# Patient Record
Sex: MALE | Race: WHITE | Employment: UNEMPLOYED | URBAN - NONMETROPOLITAN AREA
[De-identification: names, ages, dates, MRNs, and addresses within clinical notes are randomized per-mention and may not be internally consistent; named-entity substitution may affect disease eponyms.]

---

## 2019-07-05 LAB
ALBUMIN SERPL-MCNC: 4.3 G/DL
ALP BLD-CCNC: 73 U/L
ALT SERPL-CCNC: 32 U/L
ANION GAP SERPL CALCULATED.3IONS-SCNC: 14 MMOL/L
AST SERPL-CCNC: 22 U/L
BASOPHILS ABSOLUTE: 0 /ΜL
BASOPHILS RELATIVE PERCENT: 0.3 %
BILIRUB SERPL-MCNC: 1.4 MG/DL (ref 0.1–1.4)
BUN BLDV-MCNC: 11 MG/DL
CALCIUM SERPL-MCNC: 9.1 MG/DL
CHLORIDE BLD-SCNC: 106 MMOL/L
CO2: 23 MMOL/L
CREAT SERPL-MCNC: 1 MG/DL
EOSINOPHILS ABSOLUTE: 0.1 /ΜL
EOSINOPHILS RELATIVE PERCENT: 0.6 %
GFR CALCULATED: >60
GLUCOSE BLD-MCNC: 103 MG/DL
HCT VFR BLD CALC: 45.4 % (ref 41–53)
HEMOGLOBIN: 15.7 G/DL (ref 13.5–17.5)
LIPASE: 32 UNITS/L
LYMPHOCYTES ABSOLUTE: 1.8 /ΜL
LYMPHOCYTES RELATIVE PERCENT: 18.7 %
MCH RBC QN AUTO: 29.5 PG
MCHC RBC AUTO-ENTMCNC: 34.5 G/DL
MCV RBC AUTO: 85.3 FL
MONOCYTES ABSOLUTE: 0.7 /ΜL
MONOCYTES RELATIVE PERCENT: 7.3 %
NEUTROPHILS ABSOLUTE: 7.2 /ΜL
NEUTROPHILS RELATIVE PERCENT: 73.1 %
PDW BLD-RTO: 12.2 %
PLATELET # BLD: 254 K/ΜL
PMV BLD AUTO: 8.5 FL
POTASSIUM SERPL-SCNC: 3.5 MMOL/L
RBC # BLD: 5.31 10^6/ΜL
SODIUM BLD-SCNC: 139 MMOL/L
TOTAL PROTEIN: 7.2
WBC # BLD: 9.8 10^3/ML

## 2019-08-20 LAB
ALBUMIN SERPL-MCNC: 4.2 G/DL
ALP BLD-CCNC: 67 U/L
ALT SERPL-CCNC: 25 U/L
ANION GAP SERPL CALCULATED.3IONS-SCNC: 12 MMOL/L
AST SERPL-CCNC: 17 U/L
BASOPHILS ABSOLUTE: 0 /ΜL
BASOPHILS RELATIVE PERCENT: 0.5 %
BILIRUB SERPL-MCNC: 0.9 MG/DL (ref 0.1–1.4)
BUN BLDV-MCNC: 23 MG/DL
CALCIUM SERPL-MCNC: 9.3 MG/DL
CHLORIDE BLD-SCNC: 106 MMOL/L
CO2: 26 MMOL/L
CREAT SERPL-MCNC: 1.3 MG/DL
EOSINOPHILS ABSOLUTE: 0.1 /ΜL
EOSINOPHILS RELATIVE PERCENT: 1.6 %
GFR CALCULATED: >60
GLUCOSE BLD-MCNC: 95 MG/DL
HCT VFR BLD CALC: 45.9 % (ref 41–53)
HEMOGLOBIN: 15.9 G/DL (ref 13.5–17.5)
INR BLD: 1
LYMPHOCYTES ABSOLUTE: 2.2 /ΜL
LYMPHOCYTES RELATIVE PERCENT: 29.6 %
MCH RBC QN AUTO: 30 PG
MCHC RBC AUTO-ENTMCNC: 34.6 G/DL
MCV RBC AUTO: 86.6 FL
MONOCYTES ABSOLUTE: 0.6 /ΜL
MONOCYTES RELATIVE PERCENT: 8.3 %
NEUTROPHILS ABSOLUTE: 4.4 /ΜL
NEUTROPHILS RELATIVE PERCENT: 60 %
PDW BLD-RTO: 12.5 %
PLATELET # BLD: 260 K/ΜL
PMV BLD AUTO: 8.6 FL
POTASSIUM SERPL-SCNC: 3.9 MMOL/L
PROTIME: 10.4 SECONDS
RBC # BLD: 5.3 10^6/ΜL
SODIUM BLD-SCNC: 140 MMOL/L
TOTAL PROTEIN: 7.4
WBC # BLD: 7.3 10^3/ML

## 2019-10-17 LAB
ALBUMIN SERPL-MCNC: NORMAL G/DL
ALP BLD-CCNC: NORMAL U/L
ALT SERPL-CCNC: NORMAL U/L
ANION GAP SERPL CALCULATED.3IONS-SCNC: 14 MMOL/L
AST SERPL-CCNC: NORMAL U/L
BASOPHILS ABSOLUTE: 0 /ΜL
BASOPHILS RELATIVE PERCENT: 0.1 %
BILIRUB SERPL-MCNC: NORMAL MG/DL
BUN BLDV-MCNC: 15 MG/DL
CALCIUM SERPL-MCNC: 9.1 MG/DL
CHLORIDE BLD-SCNC: 104 MMOL/L
CO2: 27 MMOL/L
CREAT SERPL-MCNC: 1.17 MG/DL
EOSINOPHILS ABSOLUTE: 0 /ΜL
EOSINOPHILS RELATIVE PERCENT: 0.1 %
GFR CALCULATED: >60
GLUCOSE BLD-MCNC: 107 MG/DL
HCT VFR BLD CALC: 42.1 % (ref 41–53)
HEMOGLOBIN: 14.5 G/DL (ref 13.5–17.5)
LYMPHOCYTES ABSOLUTE: 1.9 /ΜL
LYMPHOCYTES RELATIVE PERCENT: 16.6 %
MCH RBC QN AUTO: 30 PG
MCHC RBC AUTO-ENTMCNC: 34.5 G/DL
MCV RBC AUTO: 86.9 FL
MONOCYTES ABSOLUTE: 1.6 /ΜL
MONOCYTES RELATIVE PERCENT: 14.3 %
NEUTROPHILS ABSOLUTE: 7.9 /ΜL
NEUTROPHILS RELATIVE PERCENT: 69 %
PDW BLD-RTO: 12.2 %
PLATELET # BLD: 263 K/ΜL
PMV BLD AUTO: 8.4 FL
POTASSIUM SERPL-SCNC: 4.2 MMOL/L
RBC # BLD: 4.84 10^6/ΜL
SODIUM BLD-SCNC: 141 MMOL/L
TOTAL PROTEIN: NORMAL
WBC # BLD: 11.5 10^3/ML

## 2020-03-11 ENCOUNTER — OFFICE VISIT (OUTPATIENT)
Dept: FAMILY MEDICINE CLINIC | Age: 30
End: 2020-03-11
Payer: MEDICAID

## 2020-03-11 VITALS
HEIGHT: 67 IN | DIASTOLIC BLOOD PRESSURE: 80 MMHG | OXYGEN SATURATION: 99 % | RESPIRATION RATE: 16 BRPM | WEIGHT: 179 LBS | SYSTOLIC BLOOD PRESSURE: 120 MMHG | HEART RATE: 84 BPM | BODY MASS INDEX: 28.09 KG/M2

## 2020-03-11 PROCEDURE — G8427 DOCREV CUR MEDS BY ELIG CLIN: HCPCS | Performed by: NURSE PRACTITIONER

## 2020-03-11 PROCEDURE — 99204 OFFICE O/P NEW MOD 45 MIN: CPT | Performed by: NURSE PRACTITIONER

## 2020-03-11 PROCEDURE — G8419 CALC BMI OUT NRM PARAM NOF/U: HCPCS | Performed by: NURSE PRACTITIONER

## 2020-03-11 PROCEDURE — 1036F TOBACCO NON-USER: CPT | Performed by: NURSE PRACTITIONER

## 2020-03-11 PROCEDURE — G8482 FLU IMMUNIZE ORDER/ADMIN: HCPCS | Performed by: NURSE PRACTITIONER

## 2020-03-11 RX ORDER — ALBUTEROL SULFATE 0.63 MG/3ML
1 SOLUTION RESPIRATORY (INHALATION) EVERY 6 HOURS PRN
COMMUNITY
End: 2022-10-20 | Stop reason: SDUPTHER

## 2020-03-11 SDOH — HEALTH STABILITY: MENTAL HEALTH: HOW OFTEN DO YOU HAVE A DRINK CONTAINING ALCOHOL?: NEVER

## 2020-03-11 ASSESSMENT — ENCOUNTER SYMPTOMS
CHEST TIGHTNESS: 0
FACIAL SWELLING: 0
TROUBLE SWALLOWING: 0
EYE DISCHARGE: 0
ABDOMINAL PAIN: 0
EYE ITCHING: 0
BLOOD IN STOOL: 0
WHEEZING: 0
CONSTIPATION: 0
SHORTNESS OF BREATH: 0
DIARRHEA: 0
EYE REDNESS: 0
BACK PAIN: 0
SINUS PAIN: 0
VOMITING: 0
NAUSEA: 0
SINUS PRESSURE: 0
COUGH: 0
SORE THROAT: 0
EYE PAIN: 0
COLOR CHANGE: 0
RECTAL PAIN: 0
ABDOMINAL DISTENTION: 0

## 2020-03-11 ASSESSMENT — PATIENT HEALTH QUESTIONNAIRE - PHQ9
1. LITTLE INTEREST OR PLEASURE IN DOING THINGS: 0
2. FEELING DOWN, DEPRESSED OR HOPELESS: 0
SUM OF ALL RESPONSES TO PHQ QUESTIONS 1-9: 0
SUM OF ALL RESPONSES TO PHQ QUESTIONS 1-9: 0
SUM OF ALL RESPONSES TO PHQ9 QUESTIONS 1 & 2: 0

## 2020-03-11 NOTE — PROGRESS NOTES
3/11/2020    Can Christopher (:  1990) is a 34 y.o. male, here for evaluation of the following medical concerns:    Patient presents with:  Establish Care  Tachycardia: x 3 days- highest 99    Feels like he can feel neck pulsating  Check pulse on wrist and timing it out  Intermittently 3-4 hours feels high  Does not check BP, just worried it is high with high heart rate  Hasn't used albuterol nebulizer since October  No situational events prior to feeling high pulse  Occasional pop every couple of weeks, no other caffeine intake  Stopped drinking daily pop 2 years ago, feels like high hear rate was well after quitting  Father had HTN, paternal grandmother also had HTN    Had 50 monitor in - was told had abnormality  Unsure of Dr. Beatriz Arenas from [address] [place] [address] 6-8 hours nightly, feels well-rested    Stiff neck x 3 days, soreness has improved. Thinks slept on it wrong      {Visit Chronic CHP (Optional):79338}    Review of Systems   Constitutional: Negative for activity change, fatigue and fever. HENT: Negative for congestion, facial swelling and trouble swallowing. Eyes: Negative for visual disturbance. Respiratory: Negative for chest tightness, shortness of breath and wheezing. Cardiovascular: Negative for chest pain, palpitations and leg swelling. Gastrointestinal: Negative for abdominal pain, constipation, diarrhea, nausea and vomiting. Genitourinary: Negative for difficulty urinating and dysuria. Musculoskeletal: Negative for back pain, joint swelling and myalgias. Skin: Negative for color change and rash. Neurological: Negative for dizziness, weakness, light-headedness and headaches. Psychiatric/Behavioral: Negative for agitation, sleep disturbance and suicidal ideas. Prior to Visit Medications    Medication Sig Taking?  Authorizing Provider   albuterol (ACCUNEB) 0.63 MG/3ML nebulizer solution Take 1 ampule by nebulization every 6 hours as needed for Wheezing Yes Paternal Grandmother     Mult Sclerosis Paternal Grandmother     Cancer Paternal Grandfather         Pancreatic        Vitals:    03/11/20 0854   BP: 120/80   Site: Left Upper Arm   Position: Sitting   Pulse: 84   Resp: 16   SpO2: 99%   Weight: 179 lb (81.2 kg)   Height: 5' 6.5\" (1.689 m)     Estimated body mass index is 28.46 kg/m² as calculated from the following:    Height as of this encounter: 5' 6.5\" (1.689 m). Weight as of this encounter: 179 lb (81.2 kg). Physical Exam  Vitals signs reviewed. Constitutional:       General: He is not in acute distress. Appearance: Normal appearance. He is not ill-appearing or toxic-appearing. HENT:      Head: Normocephalic. Right Ear: Tympanic membrane, ear canal and external ear normal.      Left Ear: Tympanic membrane, ear canal and external ear normal.      Nose: Nose normal.      Mouth/Throat:      Mouth: Mucous membranes are moist.   Eyes:      Pupils: Pupils are equal, round, and reactive to light. Neck:      Musculoskeletal: Normal range of motion. Cardiovascular:      Rate and Rhythm: Normal rate. Pulses: Normal pulses. Pulmonary:      Effort: Pulmonary effort is normal. No respiratory distress. Abdominal:      General: There is no distension. Palpations: Abdomen is soft. Tenderness: There is no abdominal tenderness. Musculoskeletal: Normal range of motion. Skin:     General: Skin is warm. Neurological:      General: No focal deficit present. Mental Status: He is alert and oriented to person, place, and time. Psychiatric:         Mood and Affect: Mood normal.         Behavior: Behavior normal.         Thought Content: Thought content normal.         Judgment: Judgment normal.         ASSESSMENT/PLAN:  There are no diagnoses linked to this encounter. No follow-ups on file. An  electronic signature was used to authenticate this note.     --Caroline Feliz on 3/11/2020 at 9:02 AM

## 2020-03-11 NOTE — PATIENT INSTRUCTIONS
medical condition or this instruction, always ask your healthcare professional. [place] [address] any warranty or liability for your use of this information.

## 2020-03-11 NOTE — PROGRESS NOTES
Laterality Date    DIAPHRAGMATIC HERNIA REPAIR      WRIST GANGLION EXCISION Right        Social History     Socioeconomic History    Marital status: Single     Spouse name: Not on file    Number of children: Not on file    Years of education: Not on file    Highest education level: Not on file   Occupational History    Not on file   Social Needs    Financial resource strain: Not on file    Food insecurity     Worry: Not on file     Inability: Not on file    Transportation needs     Medical: Not on file     Non-medical: Not on file   Tobacco Use    Smoking status: Never Smoker    Smokeless tobacco: Never Used   Substance and Sexual Activity    Alcohol use: Never     Frequency: Never    Drug use: Never    Sexual activity: Not on file   Lifestyle    Physical activity     Days per week: Not on file     Minutes per session: Not on file    Stress: Not on file   Relationships    Social connections     Talks on phone: Not on file     Gets together: Not on file     Attends Jewish service: Not on file     Active member of club or organization: Not on file     Attends meetings of clubs or organizations: Not on file     Relationship status: Not on file    Intimate partner violence     Fear of current or ex partner: Not on file     Emotionally abused: Not on file     Physically abused: Not on file     Forced sexual activity: Not on file   Other Topics Concern    Not on file   Social History Narrative    Not on file        Family History   Problem Relation Age of Onset    Asthma Mother     Mult Sclerosis Father     Heart Disease Father     High Blood Pressure Father     Asthma Brother     Alzheimer's Disease Maternal Grandmother     Liver Disease Maternal Grandfather     Heart Disease Paternal Grandmother     High Blood Pressure Paternal Grandmother     Mult Sclerosis Paternal Grandmother     Cancer Paternal Grandfather         Pancreatic        Vitals:    03/11/20 0854   BP: 120/80   Site: Left Upper Arm   Position: Sitting   Pulse: 84   Resp: 16   SpO2: 99%   Weight: 179 lb (81.2 kg)   Height: 5' 6.5\" (1.689 m)     Estimated body mass index is 28.46 kg/m² as calculated from the following:    Height as of this encounter: 5' 6.5\" (1.689 m). Weight as of this encounter: 179 lb (81.2 kg). Physical Exam  Vitals signs and nursing note reviewed. Constitutional:       General: He is not in acute distress. Appearance: Normal appearance. He is well-developed. He is not ill-appearing or diaphoretic. HENT:      Head: Normocephalic and atraumatic. Right Ear: Tympanic membrane, ear canal and external ear normal. Tympanic membrane is not injected or erythematous. Left Ear: Tympanic membrane, ear canal and external ear normal. Tympanic membrane is not injected or erythematous. Nose: Nose normal. No congestion or rhinorrhea. Mouth/Throat:      Mouth: Mucous membranes are moist.      Pharynx: Uvula midline. Eyes:      General:         Right eye: No discharge. Left eye: No discharge. Conjunctiva/sclera: Conjunctivae normal.      Pupils: Pupils are equal, round, and reactive to light. Neck:      Musculoskeletal: Full passive range of motion without pain, normal range of motion and neck supple. Thyroid: No thyromegaly. Trachea: Trachea normal.   Cardiovascular:      Rate and Rhythm: Normal rate and regular rhythm. Pulses: Normal pulses. Heart sounds: Normal heart sounds, S1 normal and S2 normal. No murmur. No friction rub. No gallop. Pulmonary:      Effort: Pulmonary effort is normal. No respiratory distress. Breath sounds: Normal breath sounds. No wheezing or rales. Chest:      Chest wall: No tenderness. Abdominal:      General: Bowel sounds are normal. There is no distension. Palpations: Abdomen is soft. There is no mass. Tenderness: There is no abdominal tenderness. There is no guarding or rebound.    Musculoskeletal: Normal range of motion. General: No swelling, tenderness or deformity. Lymphadenopathy:      Cervical: No cervical adenopathy. Skin:     General: Skin is warm and dry. Capillary Refill: Capillary refill takes less than 2 seconds. Neurological:      General: No focal deficit present. Mental Status: He is alert and oriented to person, place, and time. Deep Tendon Reflexes: Reflexes are normal and symmetric. Psychiatric:         Mood and Affect: Mood normal.         Behavior: Behavior normal.         Thought Content: Thought content normal.         Judgment: Judgment normal.         ASSESSMENT/PLAN:  1. Encounter to establish care      2. Tachycardia  Patient not tachycardic in office. EKG NSR rate 72. Exam unremarkable. Likely related to stress and caffeine intake. Encouraged reduction of caffeine and the below labs being checked. - EKG 12 lead; Future  - TSH; Future  - Comprehensive Metabolic Panel; Future  - CBC With Auto Differential; Future  - CBC With Auto Differential  - Comprehensive Metabolic Panel  - TSH      Return in about 1 week (around 3/18/2020) for Discuss results. An  electronic signature was used to authenticate this note.     --ZANDRA Sue - CNP on 3/19/2020 at 2:24 PM

## 2020-03-12 LAB
ALBUMIN SERPL-MCNC: 4.7 G/DL (ref 3.5–5.1)
ALP BLD-CCNC: 76 U/L (ref 38–126)
ALT SERPL-CCNC: 21 U/L (ref 11–66)
ANION GAP SERPL CALCULATED.3IONS-SCNC: 18 MEQ/L (ref 8–16)
AST SERPL-CCNC: 18 U/L (ref 5–40)
BASOPHILS # BLD: 0.5 %
BASOPHILS ABSOLUTE: 0 THOU/MM3 (ref 0–0.1)
BILIRUB SERPL-MCNC: 1.1 MG/DL (ref 0.3–1.2)
BUN BLDV-MCNC: 15 MG/DL (ref 7–22)
CALCIUM SERPL-MCNC: 9.5 MG/DL (ref 8.5–10.5)
CHLORIDE BLD-SCNC: 100 MEQ/L (ref 98–111)
CO2: 21 MEQ/L (ref 23–33)
CREAT SERPL-MCNC: 1 MG/DL (ref 0.4–1.2)
EOSINOPHIL # BLD: 0.8 %
EOSINOPHILS ABSOLUTE: 0.1 THOU/MM3 (ref 0–0.4)
ERYTHROCYTE [DISTWIDTH] IN BLOOD BY AUTOMATED COUNT: 11.4 % (ref 11.5–14.5)
ERYTHROCYTE [DISTWIDTH] IN BLOOD BY AUTOMATED COUNT: 36.3 FL (ref 35–45)
GFR SERPL CREATININE-BSD FRML MDRD: 88 ML/MIN/1.73M2
GLUCOSE BLD-MCNC: 85 MG/DL (ref 70–108)
HCT VFR BLD CALC: 49.4 % (ref 42–52)
HEMOGLOBIN: 16.5 GM/DL (ref 14–18)
IMMATURE GRANS (ABS): 0.03 THOU/MM3 (ref 0–0.07)
IMMATURE GRANULOCYTES: 0.4 %
LYMPHOCYTES # BLD: 22.7 %
LYMPHOCYTES ABSOLUTE: 1.9 THOU/MM3 (ref 1–4.8)
MCH RBC QN AUTO: 29.8 PG (ref 26–33)
MCHC RBC AUTO-ENTMCNC: 33.4 GM/DL (ref 32.2–35.5)
MCV RBC AUTO: 89.3 FL (ref 80–94)
MONOCYTES # BLD: 7.1 %
MONOCYTES ABSOLUTE: 0.6 THOU/MM3 (ref 0.4–1.3)
NUCLEATED RED BLOOD CELLS: 0 /100 WBC
PLATELET # BLD: 268 THOU/MM3 (ref 130–400)
PMV BLD AUTO: 10.7 FL (ref 9.4–12.4)
POTASSIUM SERPL-SCNC: 4.5 MEQ/L (ref 3.5–5.2)
RBC # BLD: 5.53 MILL/MM3 (ref 4.7–6.1)
SEG NEUTROPHILS: 68.5 %
SEGMENTED NEUTROPHILS ABSOLUTE COUNT: 5.8 THOU/MM3 (ref 1.8–7.7)
SODIUM BLD-SCNC: 139 MEQ/L (ref 135–145)
TOTAL PROTEIN: 7.3 G/DL (ref 6.1–8)
TSH SERPL DL<=0.05 MIU/L-ACNC: 0.67 UIU/ML (ref 0.4–4.2)
WBC # BLD: 8.5 THOU/MM3 (ref 4.8–10.8)

## 2020-03-17 ENCOUNTER — TELEPHONE (OUTPATIENT)
Dept: FAMILY MEDICINE CLINIC | Age: 30
End: 2020-03-17

## 2020-03-25 NOTE — TELEPHONE ENCOUNTER
Spoke with patient regarding setting up a Virtual Visit through [employer]. Patient does not have capabilities to have any apps, however, has a desk top computer. Notified patient that we will contact him shortly with info regarding V V through his desk top.

## 2020-03-26 NOTE — TELEPHONE ENCOUNTER
Once I receive results and records from the cardiologist I will see if he needs to be brought into the office.

## 2020-07-21 ENCOUNTER — OFFICE VISIT (OUTPATIENT)
Dept: FAMILY MEDICINE CLINIC | Age: 30
End: 2020-07-21
Payer: MEDICAID

## 2020-07-21 VITALS
DIASTOLIC BLOOD PRESSURE: 80 MMHG | OXYGEN SATURATION: 98 % | HEART RATE: 68 BPM | BODY MASS INDEX: 29.89 KG/M2 | RESPIRATION RATE: 16 BRPM | SYSTOLIC BLOOD PRESSURE: 120 MMHG | HEIGHT: 66 IN | WEIGHT: 186 LBS | TEMPERATURE: 98.6 F

## 2020-07-21 PROCEDURE — 1036F TOBACCO NON-USER: CPT | Performed by: NURSE PRACTITIONER

## 2020-07-21 PROCEDURE — G8417 CALC BMI ABV UP PARAM F/U: HCPCS | Performed by: NURSE PRACTITIONER

## 2020-07-21 PROCEDURE — G8427 DOCREV CUR MEDS BY ELIG CLIN: HCPCS | Performed by: NURSE PRACTITIONER

## 2020-07-21 PROCEDURE — 99214 OFFICE O/P EST MOD 30 MIN: CPT | Performed by: NURSE PRACTITIONER

## 2020-07-21 RX ORDER — SULFAMETHOXAZOLE AND TRIMETHOPRIM 800; 160 MG/1; MG/1
1 TABLET ORAL 2 TIMES DAILY
Qty: 14 TABLET | Refills: 0 | Status: SHIPPED | OUTPATIENT
Start: 2020-07-21 | End: 2020-07-28

## 2020-07-21 RX ORDER — PREDNISONE 10 MG/1
TABLET ORAL
Qty: 42 TABLET | Refills: 0 | Status: SHIPPED | OUTPATIENT
Start: 2020-07-21 | End: 2020-11-18 | Stop reason: ALTCHOICE

## 2020-07-21 ASSESSMENT — ENCOUNTER SYMPTOMS
RHINORRHEA: 0
SORE THROAT: 0
SINUS PAIN: 0
NAUSEA: 0
SHORTNESS OF BREATH: 0
VOMITING: 0
EYE ITCHING: 0
EYE DISCHARGE: 0
EYE PAIN: 0

## 2020-07-21 NOTE — PATIENT INSTRUCTIONS
Patient Education        Dermatitis: Care Instructions  Your Care Instructions  Dermatitis is the general name used for any rash or inflammation of the skin. Different kinds of dermatitis cause different kinds of rashes. Common causes of a rash include new medicines, plants (such as poison oak or poison ivy), heat, and stress. Certain illnesses can also cause a rash. An allergic reaction to something that touches your skin, such as latex, nickel, or poison ivy, is called contact dermatitis. Contact dermatitis may also be caused by something that irritates the skin, such as bleach, a chemical, or soap. These types of rashes cannot be spread from person to person. How long your rash will last depends on what caused it. Rashes may last a few days or months. Follow-up care is a key part of your treatment and safety. Be sure to make and go to all appointments, and call your doctor if you are having problems. It's also a good idea to know your test results and keep a list of the medicines you take. How can you care for yourself at home? · Do not scratch the rash. Cut your nails short, and file them smooth. Or wear gloves if this helps keep you from scratching. · Wash the area with water only. Pat dry. · Put cold, wet cloths on the rash to reduce itching. · Keep cool, and stay out of the sun. · Leave the rash open to the air as much as possible. · If the rash itches, use hydrocortisone cream. Follow the directions on the label. Calamine lotion may help for plant rashes. · Take an over-the-counter antihistamine, such as diphenhydramine (Benadryl) or loratadine (Claritin), to help calm the itching. Read and follow all instructions on the label. · If your doctor prescribed a cream, use it as directed. If your doctor prescribed medicine, take it exactly as directed. When should you call for help?    Call your doctor now or seek immediate medical care if:  · You have symptoms of infection, such as:  ? Increased pain, swelling, warmth, or redness. ? Red streaks leading from the area. ? Pus draining from the area. ? A fever. · You have joint pain along with the rash. Watch closely for changes in your health, and be sure to contact your doctor if:  · Your rash is changing or getting worse. · You are not getting better as expected. Where can you learn more? Go to [email or web address] org and sign in to your [employer] account. Enter [phone] in the [facility] box to learn more about \"Dermatitis: Care Instructions. \"     If you do not have an account, please click on the \"Sign Up Now\" link. Current as of: October 31, 2019               Content Version: 12.5  © 4095-7802 [employer]wise, Incorporated. Care instructions adapted under license by [facility] ([facility]). If you have questions about a medical condition or this instruction, always ask your healthcare professional. [place] [address] any warranty or liability for your use of this information.

## 2020-07-21 NOTE — PROGRESS NOTES
2020     Kathy Gandara (:  1990) is a 34 y.o. male, here for evaluation of the following medical concerns:    Patient is here with complaints of a rash to bilateral arms, states it itches and it started yesterday. He stated yesterday there was a bit of a yellow drainage. He does think he could have been exposed to poison ivy that was on a telephone post the day prior. He denies any shortness of breath    Rash   This is a new problem. The current episode started yesterday. The problem has been gradually worsening since onset. The affected locations include the left arm and right arm. The rash is characterized by itchiness and blistering. He was exposed to plant contact. Pertinent negatives include no eye pain, fatigue, fever, rhinorrhea, shortness of breath, sore throat or vomiting. Past treatments include anti-itch cream. The treatment provided mild relief. Review of Systems   Constitutional: Negative for diaphoresis, fatigue and fever. HENT: Negative for rhinorrhea, sinus pain and sore throat. Eyes: Negative for pain, discharge and itching. Respiratory: Negative for shortness of breath. Cardiovascular: Negative for chest pain and leg swelling. Gastrointestinal: Negative for nausea and vomiting. Musculoskeletal: Negative for myalgias and neck pain. Skin: Positive for rash. Prior to Visit Medications    Medication Sig Taking?  Authorizing Provider   predniSONE (DELTASONE) 10 MG tablet 6 tabs daily for 2 days, 5 tabs daily for 2 days, 4 tabs daily for 2 days, 3 tabs daily for 2 days, 2 tabs daily for 2 days, 1 tab daily for 2 days Yes Gabby Solares, APRN - CNP   sulfamethoxazole-trimethoprim (BACTRIM DS;SEPTRA DS) 800-160 MG per tablet Take 1 tablet by mouth 2 times daily for 7 days Yes Gabby Rank, APRN - CNP   albuterol (ACCUNEB) 0.63 MG/3ML nebulizer solution Take 1 ampule by nebulization every 6 hours as needed for Wheezing Yes Historical Provider, MD Social History     Tobacco Use    Smoking status: Never Smoker    Smokeless tobacco: Never Used   Substance Use Topics    Alcohol use: Never     Frequency: Never        Vitals:    07/21/20 1342   BP: 120/80   Site: Left Upper Arm   Position: Sitting   Pulse: 68   Resp: 16   Temp: 98.6 °F (37 °C)   TempSrc: Temporal   SpO2: 98%   Weight: 186 lb (84.4 kg)   Height: 5' 6\" (1.676 m)     Estimated body mass index is 30.02 kg/m² as calculated from the following:    Height as of this encounter: 5' 6\" (1.676 m). Weight as of this encounter: 186 lb (84.4 kg). Physical Exam  Vitals signs reviewed. Constitutional:       Appearance: Normal appearance. He is normal weight. HENT:      Head: Normocephalic. Right Ear: External ear normal.      Left Ear: External ear normal.      Nose: Nose normal.      Mouth/Throat:      Mouth: Mucous membranes are moist.      Pharynx: Oropharynx is clear. Eyes:      Extraocular Movements: Extraocular movements intact. Pupils: Pupils are equal, round, and reactive to light. Neck:      Musculoskeletal: Normal range of motion and neck supple. No neck rigidity or muscular tenderness. Cardiovascular:      Rate and Rhythm: Normal rate and regular rhythm. Pulmonary:      Effort: Pulmonary effort is normal.      Breath sounds: Normal breath sounds. Musculoskeletal: Normal range of motion. Lymphadenopathy:      Cervical: No cervical adenopathy. Skin:     Findings: Rash present. Rash is crusting and vesicular. Comments: Erythematous, vesicular rash noted to bilateral arms located in the inside of the elbow folds. Also several lesions noted to bilateral wrist.  Small area noted to the patients left nares. Neurological:      General: No focal deficit present. Mental Status: He is alert and oriented to person, place, and time. Psychiatric:         Mood and Affect: Mood normal.         ASSESSMENT/PLAN:  1.  Allergic contact dermatitis due to plants, except food  Exam consistent with contact dermatitis   - predniSONE (DELTASONE) 10 MG tablet; 6 tabs daily for 2 days, 5 tabs daily for 2 days, 4 tabs daily for 2 days, 3 tabs daily for 2 days, 2 tabs daily for 2 days, 1 tab daily for 2 days  Dispense: 42 tablet; Refill: 0  - sulfamethoxazole-trimethoprim (BACTRIM DS;SEPTRA DS) 800-160 MG per tablet; Take 1 tablet by mouth 2 times daily for 7 days  Dispense: 14 tablet; Refill: 0      Return in about 1 week (around 7/28/2020), or if symptoms worsen or fail to improve. An electronic signature was used to authenticate this note.     --ZANDRA Burnette CNP on 7/21/2020 at 3:23 PM

## 2020-11-18 ENCOUNTER — OFFICE VISIT (OUTPATIENT)
Dept: FAMILY MEDICINE CLINIC | Age: 30
End: 2020-11-18
Payer: MEDICAID

## 2020-11-18 VITALS
HEART RATE: 75 BPM | RESPIRATION RATE: 16 BRPM | DIASTOLIC BLOOD PRESSURE: 84 MMHG | OXYGEN SATURATION: 98 % | WEIGHT: 184 LBS | SYSTOLIC BLOOD PRESSURE: 120 MMHG | HEIGHT: 66 IN | TEMPERATURE: 97.8 F | BODY MASS INDEX: 29.57 KG/M2

## 2020-11-18 PROCEDURE — 93000 ELECTROCARDIOGRAM COMPLETE: CPT | Performed by: NURSE PRACTITIONER

## 2020-11-18 PROCEDURE — 90471 IMMUNIZATION ADMIN: CPT | Performed by: NURSE PRACTITIONER

## 2020-11-18 PROCEDURE — G8427 DOCREV CUR MEDS BY ELIG CLIN: HCPCS | Performed by: NURSE PRACTITIONER

## 2020-11-18 PROCEDURE — G8482 FLU IMMUNIZE ORDER/ADMIN: HCPCS | Performed by: NURSE PRACTITIONER

## 2020-11-18 PROCEDURE — 90686 IIV4 VACC NO PRSV 0.5 ML IM: CPT | Performed by: NURSE PRACTITIONER

## 2020-11-18 PROCEDURE — G8417 CALC BMI ABV UP PARAM F/U: HCPCS | Performed by: NURSE PRACTITIONER

## 2020-11-18 PROCEDURE — 99214 OFFICE O/P EST MOD 30 MIN: CPT | Performed by: NURSE PRACTITIONER

## 2020-11-18 PROCEDURE — 1036F TOBACCO NON-USER: CPT | Performed by: NURSE PRACTITIONER

## 2020-11-18 ASSESSMENT — ENCOUNTER SYMPTOMS
SINUS PRESSURE: 0
SORE THROAT: 0
EYE PAIN: 0
TROUBLE SWALLOWING: 0
COLOR CHANGE: 0
RECTAL PAIN: 0
BLOOD IN STOOL: 0
VOMITING: 0
EYE DISCHARGE: 0
CONSTIPATION: 0
ABDOMINAL DISTENTION: 0
EYE REDNESS: 0
COUGH: 0
EYE ITCHING: 0
WHEEZING: 0
SINUS PAIN: 0
FACIAL SWELLING: 0
NAUSEA: 0
CHEST TIGHTNESS: 0
DIARRHEA: 0
BACK PAIN: 0
SHORTNESS OF BREATH: 0
ABDOMINAL PAIN: 0

## 2020-11-18 NOTE — PROGRESS NOTES
After obtaining consent, and per orders of Ya Montgomery CNP, injection of Afluria  given in Left deltoid by Vilma Jacinto. Patient instructed to remain in clinic for 20 minutes afterwards, and to report any adverse reaction to me immediately.     Immunizations Administered     Name Date Dose Route    Influenza, Quadv, IM, PF (6 mo and older Fluzone, Flulaval, Fluarix, and 3 yrs and older Afluria) 11/18/2020 0.5 mL Intramuscular    Site: Deltoid- Left    Lot: [id number]    NDC: 43944-655-59        Flu vaccine form completed- Patient tolerated injection well

## 2020-11-18 NOTE — PROGRESS NOTES
thyromegaly. Trachea: Trachea normal.   Cardiovascular:      Rate and Rhythm: Normal rate and regular rhythm. Pulses: Normal pulses. Heart sounds: Normal heart sounds, S1 normal and S2 normal. No murmur. No friction rub. No gallop. Pulmonary:      Effort: Pulmonary effort is normal. No respiratory distress. Breath sounds: Normal breath sounds. No wheezing or rales. Chest:      Chest wall: No tenderness. Abdominal:      General: A surgical scar is present. Bowel sounds are normal. There is no distension. Palpations: Abdomen is soft. There is no mass. Tenderness: There is no abdominal tenderness. There is no right CVA tenderness, left CVA tenderness, guarding or rebound. Hernia: No hernia is present. Musculoskeletal: Normal range of motion. General: No tenderness or deformity. Skin:     General: Skin is warm and dry. Capillary Refill: Capillary refill takes less than 2 seconds. Neurological:      Mental Status: He is alert and oriented to person, place, and time. Deep Tendon Reflexes: Reflexes are normal and symmetric. Psychiatric:         Mood and Affect: Mood normal.         Behavior: Behavior normal.         ASSESSMENT/PLAN:  1. Diaphragmatic hernia without obstruction and without gangrene  Patient had robotic Laparoscopic surgery for the above in October 2019, was to follow up 1 year post op. Still having regurgitation issues. The surgeon who performed the surgery is no longer with the practice. Patient given office number, and instructed to call to make an appointment with another surgeon at the office. Referral also placed. - External Referral To General Surgery    2. Increased heart rate  Pt continues with off and on feelings like his heart is racing and pounding. EKG in office today unremarkable NSR 75  Had a stress test done last October, No ischemia, but did show inappropriate ST. Followed by Holter.  He did not follow up with Cardiologist after that. Appointment made while in the office with cardiologist this Friday. - EKG 12 lead; Future  - EKG 12 lead    3. Need for influenza vaccination  - INFLUENZA, QUADV, 3 YRS AND OLDER, IM PF, PREFILL SYR OR SDV, 0.5ML (AFLURIA QUADV, PF)      Return in about 2 weeks (around 12/2/2020). An electronic signature was used to authenticate this note.     --ZANDRA Klein - CNP on 11/18/2020 at 9:58 AM

## 2022-05-25 ENCOUNTER — TELEPHONE (OUTPATIENT)
Dept: FAMILY MEDICINE CLINIC | Age: 32
End: 2022-05-25

## 2022-05-25 NOTE — TELEPHONE ENCOUNTER
Patient calling in regards to having chest/rib pain. Only happen for a few seconds the past 4-5 days. Rates it as a 1/10. States it feels like a bruise under his chest. Patient states he has been doing more physical activity outside and thinks this is what is causing the pain. Advised patient to be evaluated by UC or ED if pain gets worse. Scheduled for appointment tomorrow 5/26/22. Patient has not been seen by our office since 2020.

## 2022-06-20 ENCOUNTER — OFFICE VISIT (OUTPATIENT)
Dept: FAMILY MEDICINE CLINIC | Age: 32
End: 2022-06-20
Payer: MEDICAID

## 2022-06-20 VITALS
WEIGHT: 187.6 LBS | DIASTOLIC BLOOD PRESSURE: 68 MMHG | BODY MASS INDEX: 30.28 KG/M2 | SYSTOLIC BLOOD PRESSURE: 118 MMHG | HEART RATE: 88 BPM | RESPIRATION RATE: 16 BRPM | OXYGEN SATURATION: 98 %

## 2022-06-20 DIAGNOSIS — J45.909 MILD ASTHMA WITHOUT COMPLICATION, UNSPECIFIED WHETHER PERSISTENT: ICD-10-CM

## 2022-06-20 DIAGNOSIS — Z13.220 LIPID SCREENING: ICD-10-CM

## 2022-06-20 DIAGNOSIS — K44.9 DIAPHRAGMATIC HERNIA WITHOUT OBSTRUCTION AND WITHOUT GANGRENE: ICD-10-CM

## 2022-06-20 DIAGNOSIS — Z11.59 NEED FOR HEPATITIS C SCREENING TEST: ICD-10-CM

## 2022-06-20 DIAGNOSIS — Z00.00 WELLNESS EXAMINATION: Primary | ICD-10-CM

## 2022-06-20 DIAGNOSIS — Z11.4 SCREENING FOR HIV (HUMAN IMMUNODEFICIENCY VIRUS): ICD-10-CM

## 2022-06-20 LAB
CHOLESTEROL, FASTING: 174 MG/DL (ref 100–199)
HDLC SERPL-MCNC: 32 MG/DL
HEPATITIS C ANTIBODY: NEGATIVE
LDL CHOLESTEROL CALCULATED: 126 MG/DL
TRIGLYCERIDE, FASTING: 80 MG/DL (ref 0–199)

## 2022-06-20 PROCEDURE — 36415 COLL VENOUS BLD VENIPUNCTURE: CPT | Performed by: NURSE PRACTITIONER

## 2022-06-20 PROCEDURE — 99395 PREV VISIT EST AGE 18-39: CPT | Performed by: NURSE PRACTITIONER

## 2022-06-20 RX ORDER — OMEPRAZOLE 10 MG/1
10 CAPSULE, DELAYED RELEASE ORAL DAILY
COMMUNITY
End: 2022-06-20 | Stop reason: SDUPTHER

## 2022-06-20 RX ORDER — OMEPRAZOLE 10 MG/1
10 CAPSULE, DELAYED RELEASE ORAL DAILY
Qty: 30 CAPSULE | Refills: 11 | Status: SHIPPED | OUTPATIENT
Start: 2022-06-20

## 2022-06-20 RX ORDER — ALBUTEROL SULFATE 90 UG/1
2 AEROSOL, METERED RESPIRATORY (INHALATION) EVERY 6 HOURS PRN
Qty: 18 G | Refills: 3 | Status: SHIPPED | OUTPATIENT
Start: 2022-06-20

## 2022-06-20 ASSESSMENT — ENCOUNTER SYMPTOMS
TROUBLE SWALLOWING: 0
WHEEZING: 0
BACK PAIN: 0
EYE REDNESS: 0
FACIAL SWELLING: 0
EYE DISCHARGE: 0
CONSTIPATION: 0
BLOOD IN STOOL: 0
SHORTNESS OF BREATH: 0
EYE ITCHING: 0
SINUS PRESSURE: 0
SINUS PAIN: 0
VOMITING: 0
SORE THROAT: 0
ABDOMINAL DISTENTION: 0
DIARRHEA: 0
NAUSEA: 0
RECTAL PAIN: 0
EYE PAIN: 0
ABDOMINAL PAIN: 0
COUGH: 0
COLOR CHANGE: 0
CHEST TIGHTNESS: 0

## 2022-06-20 ASSESSMENT — PATIENT HEALTH QUESTIONNAIRE - PHQ9
SUM OF ALL RESPONSES TO PHQ QUESTIONS 1-9: 0
SUM OF ALL RESPONSES TO PHQ9 QUESTIONS 1 & 2: 0
1. LITTLE INTEREST OR PLEASURE IN DOING THINGS: 0
2. FEELING DOWN, DEPRESSED OR HOPELESS: 0
SUM OF ALL RESPONSES TO PHQ QUESTIONS 1-9: 0

## 2022-06-20 NOTE — PROGRESS NOTES
Health Maintenance Due   Topic Date Due    Depression Screen  Never done    HIV screen  Never done    Hepatitis C screen  Never done    DTaP/Tdap/Td vaccine (1 - Tdap) Never done- due    COVID-19 Vaccine (3 - Booster for Moderna series) 10/27/2021- due

## 2022-06-20 NOTE — PATIENT INSTRUCTIONS
Patient Education        Well Visit, Ages 25 to 48: Care Instructions  Overview     Well visits can help you stay healthy. Your doctor has checked your overall health and may have suggested ways to take good care of yourself. Your doctor also may have recommended tests. At home, you can help prevent illness withhealthy eating, regular exercise, and other steps. Follow-up care is a key part of your treatment and safety. Be sure to make and go to all appointments, and call your doctor if you are having problems. It's also a good idea to know your test results and keep alist of the medicines you take. How can you care for yourself at home?  Get screening tests that you and your doctor decide on. Screening helps find diseases before any symptoms appear.  Eat healthy foods. Choose fruits, vegetables, whole grains, protein, and low-fat dairy foods. Limit fat, especially saturated fat. Reduce salt in your diet.  Limit alcohol. If you are a man, have no more than 2 drinks a day or 14 drinks a week. If you are a woman, have no more than 1 drink a day or 7 drinks a week.  Get at least 30 minutes of physical activity on most days of the week. Walking is a good choice. You also may want to do other activities, such as running, swimming, cycling, or playing tennis or team sports. Discuss any changes in your exercise program with your doctor.  Reach and stay at a healthy weight. This will lower your risk for many problems, such as obesity, diabetes, heart disease, and high blood pressure.  Do not smoke or allow others to smoke around you. If you need help quitting, talk to your doctor about stop-smoking programs and medicines. These can increase your chances of quitting for good.  Care for your mental health. It is easy to get weighed down by worry and stress. Learn strategies to manage stress, like deep breathing and mindfulness, and stay connected with your family and community.  If you find you often feel sad If you have questions about a medical condition or this instruction, always ask your healthcare professional. [place] [address] any warranty or liability for your use of this information.

## 2022-06-20 NOTE — PROGRESS NOTES
[address] [address]  Dept: [phone]  Loc: Anshul Ortiz (:  1990) is a [place] y.o. male,Established patient, here for evaluation of the following chief complaint(s): Annual Exam      ASSESSMENT/PLAN:  1. Wellness examination  Unremarkable well exam.   Refills provided. Encouraged 8 hours of sleep, 64 ounces of water and 30 minutes of daily activity. Patient reminded and encouraged to make the necessary lifestyle modifications if appropriate: Weight loss, DASH diet, Reduce sodium, Increase physical activity, Moderate Alcohol consumption. 2. Screening for HIV (human immunodeficiency virus)  -     HIV Screen; Future  3. Need for hepatitis C screening test  -     Hepatitis C Antibody; Future  4. Diaphragmatic hernia without obstruction and without gangrene  -     omeprazole (PRILOSEC) 10 MG delayed release capsule; Take 1 capsule by mouth daily, Disp-30 capsule, R-11Normal  5. Mild asthma without complication, unspecified whether persistent  -     albuterol sulfate HFA (VENTOLIN HFA) 108 (90 Base) MCG/ACT inhaler; Inhale 2 puffs into the lungs every 6 hours as needed for Wheezing, Disp-18 g, R-3Normal  6. Lipid screening  -     Lipid, Fasting; Future      Return in about 1 year (around 2023) for wellness . SUBJECTIVE/OBJECTIVE:  Here for wellness, overall doing well. Needs some refills on inhaler and reflux medication. Working on decreasing caffiene intake.        has a past medical history of Asthma and History of chickenpox. Review of Systems   Constitutional: Negative for activity change, appetite change, fatigue, fever and unexpected weight change. HENT: Negative for congestion, dental problem, ear pain, facial swelling, mouth sores, postnasal drip, sinus pressure, sinus pain, sore throat and trouble swallowing.     Eyes: Negative for pain, discharge, redness, itching and visual disturbance. Respiratory: Negative for cough, chest tightness, shortness of breath and wheezing. Cardiovascular: Negative for chest pain, palpitations and leg swelling. Gastrointestinal: Negative for abdominal distention, abdominal pain, blood in stool, constipation, diarrhea, nausea, rectal pain and vomiting. Endocrine: Negative for cold intolerance and heat intolerance. Genitourinary: Negative for difficulty urinating, dysuria, frequency, hematuria, penile pain, scrotal swelling, testicular pain and urgency. Musculoskeletal: Negative for arthralgias, back pain, gait problem and neck pain. Skin: Negative for color change, rash and wound. Neurological: Negative for dizziness, seizures, weakness, light-headedness, numbness and headaches. Psychiatric/Behavioral: Negative for agitation and sleep disturbance. Physical Exam  Vitals and nursing note reviewed. Constitutional:       General: He is not in acute distress. Appearance: Normal appearance. He is well-developed. He is not ill-appearing or diaphoretic. HENT:      Head: Normocephalic and atraumatic. Right Ear: Tympanic membrane, ear canal and external ear normal. Tympanic membrane is not injected or erythematous. Left Ear: Tympanic membrane, ear canal and external ear normal. Tympanic membrane is not injected or erythematous. Nose: Nose normal.      Mouth/Throat:      Pharynx: Oropharynx is clear. Uvula midline. Eyes:      General:         Right eye: No discharge. Left eye: No discharge. Conjunctiva/sclera: Conjunctivae normal.      Pupils: Pupils are equal, round, and reactive to light. Neck:      Thyroid: No thyromegaly. Trachea: Trachea normal.   Cardiovascular:      Rate and Rhythm: Normal rate and regular rhythm. Pulses: Normal pulses. Heart sounds: Normal heart sounds, S1 normal and S2 normal. No murmur heard. No friction rub. No gallop.     Pulmonary:      Effort: Pulmonary effort is normal. No respiratory distress. Breath sounds: Normal breath sounds. No wheezing or rales. Chest:      Chest wall: No tenderness. Abdominal:      General: Bowel sounds are normal. There is no distension. Palpations: Abdomen is soft. There is no mass. Tenderness: There is no abdominal tenderness. There is no guarding or rebound. Musculoskeletal:         General: No tenderness or deformity. Normal range of motion. Cervical back: Full passive range of motion without pain, normal range of motion and neck supple. No rigidity or tenderness. Lymphadenopathy:      Cervical: No cervical adenopathy. Skin:     General: Skin is warm and dry. Capillary Refill: Capillary refill takes less than 2 seconds. Neurological:      Mental Status: He is alert and oriented to person, place, and time. Deep Tendon Reflexes: Reflexes are normal and symmetric. Psychiatric:         Mood and Affect: Mood normal.         Behavior: Behavior normal.         Thought Content: Thought content normal.         Judgment: Judgment normal.             An electronic signature was used to authenticate this note.     --Josefa Pittman, ZANDRA - CNP

## 2022-06-21 LAB — HIV AG/AB: NONREACTIVE

## 2022-10-20 DIAGNOSIS — J45.909 MILD ASTHMA WITHOUT COMPLICATION, UNSPECIFIED WHETHER PERSISTENT: Primary | ICD-10-CM

## 2022-10-20 RX ORDER — ALBUTEROL SULFATE 0.63 MG/3ML
1 SOLUTION RESPIRATORY (INHALATION) EVERY 6 HOURS PRN
Qty: 90 ML | Refills: 0 | Status: SHIPPED | OUTPATIENT
Start: 2022-10-20 | End: 2022-11-19

## 2022-10-20 NOTE — TELEPHONE ENCOUNTER
Pt called requesting a prescription of albuterol solution be sent to [facility] in [facility].  Appt is scheduled for tomorrow @ 9:40am.

## 2022-10-21 ENCOUNTER — OFFICE VISIT (OUTPATIENT)
Dept: FAMILY MEDICINE CLINIC | Age: 32
End: 2022-10-21
Payer: MEDICAID

## 2022-10-21 VITALS
HEIGHT: 66 IN | RESPIRATION RATE: 16 BRPM | SYSTOLIC BLOOD PRESSURE: 126 MMHG | DIASTOLIC BLOOD PRESSURE: 80 MMHG | BODY MASS INDEX: 30.98 KG/M2 | OXYGEN SATURATION: 96 % | HEART RATE: 82 BPM | WEIGHT: 192.8 LBS

## 2022-10-21 DIAGNOSIS — Z23 FLU VACCINE NEED: ICD-10-CM

## 2022-10-21 DIAGNOSIS — J45.21 MILD INTERMITTENT ASTHMA WITH ACUTE EXACERBATION: Primary | ICD-10-CM

## 2022-10-21 PROCEDURE — G8482 FLU IMMUNIZE ORDER/ADMIN: HCPCS | Performed by: NURSE PRACTITIONER

## 2022-10-21 PROCEDURE — G8417 CALC BMI ABV UP PARAM F/U: HCPCS | Performed by: NURSE PRACTITIONER

## 2022-10-21 PROCEDURE — 90471 IMMUNIZATION ADMIN: CPT | Performed by: NURSE PRACTITIONER

## 2022-10-21 PROCEDURE — G8427 DOCREV CUR MEDS BY ELIG CLIN: HCPCS | Performed by: NURSE PRACTITIONER

## 2022-10-21 PROCEDURE — 90674 CCIIV4 VAC NO PRSV 0.5 ML IM: CPT | Performed by: NURSE PRACTITIONER

## 2022-10-21 PROCEDURE — 1036F TOBACCO NON-USER: CPT | Performed by: NURSE PRACTITIONER

## 2022-10-21 PROCEDURE — 99214 OFFICE O/P EST MOD 30 MIN: CPT | Performed by: NURSE PRACTITIONER

## 2022-10-21 SDOH — ECONOMIC STABILITY: FOOD INSECURITY: WITHIN THE PAST 12 MONTHS, THE FOOD YOU BOUGHT JUST DIDN'T LAST AND YOU DIDN'T HAVE MONEY TO GET MORE.: NEVER TRUE

## 2022-10-21 SDOH — ECONOMIC STABILITY: FOOD INSECURITY: WITHIN THE PAST 12 MONTHS, YOU WORRIED THAT YOUR FOOD WOULD RUN OUT BEFORE YOU GOT MONEY TO BUY MORE.: NEVER TRUE

## 2022-10-21 ASSESSMENT — ENCOUNTER SYMPTOMS
SHORTNESS OF BREATH: 0
CHEST TIGHTNESS: 0
WHEEZING: 1
COUGH: 1
GASTROINTESTINAL NEGATIVE: 1
FREQUENT THROAT CLEARING: 1
SPUTUM PRODUCTION: 1
DIFFICULTY BREATHING: 0
SINUS PRESSURE: 0
SORE THROAT: 0
TROUBLE SWALLOWING: 0

## 2022-10-21 ASSESSMENT — SOCIAL DETERMINANTS OF HEALTH (SDOH): HOW HARD IS IT FOR YOU TO PAY FOR THE VERY BASICS LIKE FOOD, HOUSING, MEDICAL CARE, AND HEATING?: NOT HARD AT ALL

## 2022-10-21 NOTE — PROGRESS NOTES
After obtaining consent, and per orders of RENNY Dye, injection of Flucelvax given in Right deltoid by Lucille Lopez MA. Patient instructed to remain in clinic for 20 minutes afterwards, and to report any adverse reaction to me immediately. Immunizations Administered       Name Date Dose Route    Influenza, FLUCELVAX, (age 10 mo+), MDCK, PF, 0.5mL 10/21/2022 0.5 mL Intramuscular    Site: Deltoid- Right    Lot: [id number]    NDC: 67805-695-95            Injection tolerated well. VIS given to pt.

## 2022-10-21 NOTE — PROGRESS NOTES
Mathew Barajas [address] [place] [id number]  Dept: [phone]  Loc: [address] [place] [address] (:  1990) is a 28 y.o. male,Established patient, here for evaluation of the following chief complaint(s): Other (Discuss prescription for albuterol. Discuss starting Advair? )      ASSESSMENT/PLAN:  1. Mild intermittent asthma with acute exacerbation  Patient nontoxic-appearing and in no acute distress. He does have asthma reports as a child he had a pulmonary function test done. In the last several years he has only been using albuterol to manage his symptoms. And this time a year using an albuterol inhaler daily and several times a day. He has no wheezing currently. But does complain occasionally of wheezing and chest congestion. Starting him on Advair to take twice a day and encouraged him to only use the albuterol inhaler as intended for a rescue inhaler with hopes of decreasing the need for this. Also wanted to start him on daily loratadine at this time he said he has some at home and did not need a prescription. He is to follow-up in 4 to 6 weeks to see how adding the Advair has helped decrease his asthmatic episodes. -     fluticasone-salmeterol (ADVAIR HFA) 115-21 MCG/ACT inhaler; Inhale 2 puffs into the lungs 2 times daily, Disp-1 each, R-11Normal  2. Flu vaccine need  -     Influenza, FLUCELVAX, (age 10 mo+), IM, Preservative Free, 0.5 mL      Return in about 4 weeks (around 2022) for Asthma . SUBJECTIVE/OBJECTIVE:  Almost every day using Albuterol this month. Prior to that once a week. Diagnosed as a child with asthma    Has only been using albuterol in last several years. Other  Associated symptoms include coughing. Pertinent negatives include no chills, congestion, fatigue, fever or sore throat. Asthma  He complains of cough, frequent throat clearing, sputum production and wheezing (occasional). There is no chest tightness, difficulty breathing or shortness of breath. This is a chronic problem. The current episode started more than 1 year ago. The problem occurs every several days. The problem has been gradually worsening. Associated symptoms include PND. Pertinent negatives include no appetite change, fever, sneezing, sore throat or trouble swallowing. His symptoms are aggravated by change in weather. His symptoms are alleviated by steroid inhaler. He reports moderate improvement on treatment. His past medical history is significant for asthma. has a past medical history of Asthma and History of chickenpox. Review of Systems   Constitutional:  Negative for appetite change, chills, fatigue and fever. HENT:  Negative for congestion, dental problem, drooling, ear discharge, sinus pressure, sneezing, sore throat, tinnitus and trouble swallowing. Respiratory:  Positive for cough, sputum production and wheezing (occasional). Negative for shortness of breath. Cardiovascular:  Positive for PND. Gastrointestinal: Negative. Genitourinary: Negative. Musculoskeletal: Negative. Neurological: Negative. Physical Exam  Vitals and nursing note reviewed. Constitutional:       General: He is not in acute distress. Appearance: Normal appearance. He is well-developed. He is not ill-appearing or diaphoretic. HENT:      Head: Normocephalic and atraumatic. Right Ear: Tympanic membrane and external ear normal. Tympanic membrane is not injected or erythematous. Left Ear: Tympanic membrane and external ear normal. Tympanic membrane is not injected or erythematous. Nose: Nose normal.      Mouth/Throat:      Mouth: Mucous membranes are moist.      Pharynx: Oropharynx is clear. Uvula midline. Eyes:      General:         Right eye: No discharge. Left eye: No discharge.       Conjunctiva/sclera: Conjunctivae normal.      Pupils: Pupils are equal, round, and reactive to light.   Neck:      Thyroid: No thyromegaly. Trachea: Trachea normal.   Cardiovascular:      Rate and Rhythm: Normal rate and regular rhythm. Pulses: Normal pulses. Heart sounds: Normal heart sounds, S1 normal and S2 normal. No murmur heard. No friction rub. No gallop. Pulmonary:      Effort: Pulmonary effort is normal. No respiratory distress. Breath sounds: Normal breath sounds. No stridor. No wheezing, rhonchi or rales. Chest:      Chest wall: No tenderness. Abdominal:      General: Bowel sounds are normal. There is no distension. Palpations: Abdomen is soft. There is no mass. Tenderness: There is no abdominal tenderness. There is no guarding or rebound. Musculoskeletal:         General: No swelling, tenderness or deformity. Normal range of motion. Cervical back: Full passive range of motion without pain, normal range of motion and neck supple. Lymphadenopathy:      Cervical: No cervical adenopathy. Skin:     General: Skin is warm and dry. Capillary Refill: Capillary refill takes less than 2 seconds. Neurological:      Mental Status: He is alert and oriented to person, place, and time. Deep Tendon Reflexes: Reflexes are normal and symmetric. An electronic signature was used to authenticate this note.     --Ira Ba, APRN - CNP

## 2022-11-18 ENCOUNTER — OFFICE VISIT (OUTPATIENT)
Dept: FAMILY MEDICINE CLINIC | Age: 32
End: 2022-11-18
Payer: MEDICAID

## 2022-11-18 VITALS
DIASTOLIC BLOOD PRESSURE: 80 MMHG | HEIGHT: 66 IN | SYSTOLIC BLOOD PRESSURE: 120 MMHG | OXYGEN SATURATION: 98 % | RESPIRATION RATE: 16 BRPM | BODY MASS INDEX: 30.79 KG/M2 | WEIGHT: 191.6 LBS | HEART RATE: 74 BPM

## 2022-11-18 DIAGNOSIS — J45.909 MODERATE ASTHMA, UNSPECIFIED WHETHER COMPLICATED, UNSPECIFIED WHETHER PERSISTENT: Primary | ICD-10-CM

## 2022-11-18 PROCEDURE — G8482 FLU IMMUNIZE ORDER/ADMIN: HCPCS | Performed by: NURSE PRACTITIONER

## 2022-11-18 PROCEDURE — 99214 OFFICE O/P EST MOD 30 MIN: CPT | Performed by: NURSE PRACTITIONER

## 2022-11-18 PROCEDURE — G8427 DOCREV CUR MEDS BY ELIG CLIN: HCPCS | Performed by: NURSE PRACTITIONER

## 2022-11-18 PROCEDURE — 1036F TOBACCO NON-USER: CPT | Performed by: NURSE PRACTITIONER

## 2022-11-18 PROCEDURE — G8417 CALC BMI ABV UP PARAM F/U: HCPCS | Performed by: NURSE PRACTITIONER

## 2022-11-18 ASSESSMENT — ENCOUNTER SYMPTOMS
WHEEZING: 0
SHORTNESS OF BREATH: 0
COUGH: 0

## 2022-11-18 NOTE — PROGRESS NOTES
[address]   Dept: [phone]  Loc: Anshul [facility] Angel (:  1990) is a 28 y.o. male,Established patient, here for evaluation of the following chief complaint(s):  Follow-up (Asthma- pt states that medication is helping )      ASSESSMENT/PLAN:  1. Moderate asthma, unspecified whether complicated, unspecified whether persistent  Pt non toxic appearing and in no acute distress. Patient taking Advair and has noticed a great improvement. Has not even used Albuterol since starting Advair. Return in about 6 months (around 2023) for f/u at  for wellness . SUBJECTIVE/OBJECTIVE:  Patient presents with: Follow-up: Asthma- pt states that medication is helping            has a past medical history of Asthma and History of chickenpox. Review of Systems   Constitutional:  Negative for appetite change, chills, diaphoresis, fatigue and fever. HENT: Negative. Respiratory:  Negative for cough, shortness of breath and wheezing. Cardiovascular: Negative. Musculoskeletal: Negative. Physical Exam  Vitals and nursing note reviewed. Constitutional:       General: He is not in acute distress. Appearance: Normal appearance. He is well-developed. He is not diaphoretic. HENT:      Head: Normocephalic and atraumatic. Right Ear: Tympanic membrane and external ear normal. Tympanic membrane is not injected or erythematous. Left Ear: Tympanic membrane and external ear normal. Tympanic membrane is not injected or erythematous. Nose: Nose normal.      Mouth/Throat:      Pharynx: Uvula midline. Eyes:      General:         Right eye: No discharge. Left eye: No discharge. Conjunctiva/sclera: Conjunctivae normal.      Pupils: Pupils are equal, round, and reactive to light. Neck:      Thyroid: No thyromegaly.       Trachea: Trachea normal.   Cardiovascular: Rate and Rhythm: Normal rate and regular rhythm. Pulses: Normal pulses. Heart sounds: Normal heart sounds, S1 normal and S2 normal. No murmur heard. No friction rub. No gallop. Pulmonary:      Effort: Pulmonary effort is normal. No respiratory distress. Breath sounds: Normal breath sounds. No wheezing or rales. Chest:      Chest wall: No tenderness. Abdominal:      General: Bowel sounds are normal. There is no distension. Palpations: Abdomen is soft. There is no mass. Tenderness: There is no abdominal tenderness. There is no guarding or rebound. Musculoskeletal:         General: No swelling, tenderness or deformity. Normal range of motion. Cervical back: Full passive range of motion without pain, normal range of motion and neck supple. Lymphadenopathy:      Cervical: No cervical adenopathy. Skin:     General: Skin is warm and dry. Capillary Refill: Capillary refill takes less than 2 seconds. Neurological:      General: No focal deficit present. Mental Status: He is alert and oriented to person, place, and time. Deep Tendon Reflexes: Reflexes are normal and symmetric. Psychiatric:         Mood and Affect: Mood normal.         Behavior: Behavior normal.           An electronic signature was used to authenticate this note.     --ZANDRA Cox - CNP

## 2023-06-18 ASSESSMENT — PATIENT HEALTH QUESTIONNAIRE - PHQ9
2. FEELING DOWN, DEPRESSED OR HOPELESS: NOT AT ALL
2. FEELING DOWN, DEPRESSED OR HOPELESS: 0
SUM OF ALL RESPONSES TO PHQ QUESTIONS 1-9: 0
SUM OF ALL RESPONSES TO PHQ9 QUESTIONS 1 & 2: 0
SUM OF ALL RESPONSES TO PHQ9 QUESTIONS 1 & 2: 0
SUM OF ALL RESPONSES TO PHQ QUESTIONS 1-9: 0
1. LITTLE INTEREST OR PLEASURE IN DOING THINGS: NOT AT ALL
1. LITTLE INTEREST OR PLEASURE IN DOING THINGS: 0

## 2023-06-20 ENCOUNTER — OFFICE VISIT (OUTPATIENT)
Dept: FAMILY MEDICINE CLINIC | Age: 33
End: 2023-06-20
Payer: MEDICAID

## 2023-06-20 VITALS
WEIGHT: 193.6 LBS | HEART RATE: 77 BPM | OXYGEN SATURATION: 96 % | DIASTOLIC BLOOD PRESSURE: 80 MMHG | HEIGHT: 66 IN | SYSTOLIC BLOOD PRESSURE: 136 MMHG | RESPIRATION RATE: 18 BRPM | BODY MASS INDEX: 31.12 KG/M2

## 2023-06-20 DIAGNOSIS — Z00.00 ENCOUNTER FOR WELL ADULT EXAM WITHOUT ABNORMAL FINDINGS: Primary | ICD-10-CM

## 2023-06-20 DIAGNOSIS — K21.9 GASTROESOPHAGEAL REFLUX DISEASE WITHOUT ESOPHAGITIS: ICD-10-CM

## 2023-06-20 DIAGNOSIS — J45.909 MODERATE ASTHMA, UNSPECIFIED WHETHER COMPLICATED, UNSPECIFIED WHETHER PERSISTENT: ICD-10-CM

## 2023-06-20 PROCEDURE — 99395 PREV VISIT EST AGE 18-39: CPT | Performed by: NURSE PRACTITIONER

## 2023-06-20 RX ORDER — OMEPRAZOLE 20 MG/1
20 CAPSULE, DELAYED RELEASE ORAL
Qty: 90 CAPSULE | Refills: 3 | Status: SHIPPED | OUTPATIENT
Start: 2023-06-20 | End: 2024-06-19

## 2023-06-20 SDOH — ECONOMIC STABILITY: FOOD INSECURITY: WITHIN THE PAST 12 MONTHS, THE FOOD YOU BOUGHT JUST DIDN'T LAST AND YOU DIDN'T HAVE MONEY TO GET MORE.: NEVER TRUE

## 2023-06-20 SDOH — ECONOMIC STABILITY: FOOD INSECURITY: WITHIN THE PAST 12 MONTHS, YOU WORRIED THAT YOUR FOOD WOULD RUN OUT BEFORE YOU GOT MONEY TO BUY MORE.: NEVER TRUE

## 2023-06-20 SDOH — ECONOMIC STABILITY: INCOME INSECURITY: HOW HARD IS IT FOR YOU TO PAY FOR THE VERY BASICS LIKE FOOD, HOUSING, MEDICAL CARE, AND HEATING?: NOT HARD AT ALL

## 2023-06-20 SDOH — ECONOMIC STABILITY: HOUSING INSECURITY
IN THE LAST 12 MONTHS, WAS THERE A TIME WHEN YOU DID NOT HAVE A STEADY PLACE TO SLEEP OR SLEPT IN A SHELTER (INCLUDING NOW)?: NO

## 2023-06-20 ASSESSMENT — ENCOUNTER SYMPTOMS
EYE DISCHARGE: 0
EYE ITCHING: 0
EYE PAIN: 0
BACK PAIN: 0
ABDOMINAL PAIN: 0
COUGH: 0
SORE THROAT: 0
SINUS PAIN: 0
SHORTNESS OF BREATH: 0
COLOR CHANGE: 0
CONSTIPATION: 0
ABDOMINAL DISTENTION: 0
BLOOD IN STOOL: 0
SINUS PRESSURE: 0
TROUBLE SWALLOWING: 0
DIARRHEA: 0
RECTAL PAIN: 0
VOMITING: 0
FACIAL SWELLING: 0
WHEEZING: 0
NAUSEA: 0
CHEST TIGHTNESS: 0
EYE REDNESS: 0

## 2023-06-20 NOTE — PROGRESS NOTES
Health Maintenance Due   Topic Date Due    Pneumococcal 0-64 years Vaccine (1 - PCV) Never done    DTaP/Tdap/Td vaccine (1 - Tdap) Never done    COVID-19 Vaccine (5 - Booster for Moderna series) 07/22/2021

## 2023-06-20 NOTE — PROGRESS NOTES
Well Adult Note  Name: Severo Falter Date: 2023   MRN: [id number] Sex: Male   Age: 28 y.o. Ethnicity: Non- / Non    : 1990 Race: White (non-)      Fredy Morfin is here for well adult exam.  History:    Hx of Asthma. Doing well. Review of Systems   Constitutional:  Negative for activity change, appetite change, fatigue, fever and unexpected weight change. HENT:  Negative for congestion, dental problem, ear pain, facial swelling, mouth sores, postnasal drip, sinus pressure, sinus pain, sore throat and trouble swallowing. Eyes:  Negative for pain, discharge, redness, itching and visual disturbance. Respiratory:  Negative for cough, chest tightness, shortness of breath and wheezing. Cardiovascular:  Negative for chest pain, palpitations and leg swelling. Gastrointestinal:  Negative for abdominal distention, abdominal pain, blood in stool, constipation, diarrhea, nausea, rectal pain and vomiting. Endocrine: Negative for cold intolerance and heat intolerance. Genitourinary:  Negative for difficulty urinating, dysuria, frequency, hematuria, penile pain, scrotal swelling, testicular pain and urgency. Musculoskeletal:  Negative for arthralgias, back pain, gait problem and neck pain. Skin:  Negative for color change, rash and wound. Neurological:  Negative for dizziness, seizures, weakness, light-headedness, numbness and headaches. Psychiatric/Behavioral:  Negative for agitation and sleep disturbance. No Known Allergies      Prior to Visit Medications    Medication Sig Taking?  Authorizing Provider   omeprazole (PRILOSEC) 20 MG delayed release capsule Take 1 capsule by mouth every morning (before breakfast) Yes ZANDRA Garrison CNP   fluticasone-salmeterol (ADVAIR HFA) 115-21 MCG/ACT inhaler Inhale 2 puffs into the lungs 2 times daily Yes ZANDRA Garrison CNP   albuterol (ACCUNEB) 0.63 MG/3ML nebulizer solution Take 3 mLs by

## 2023-06-20 NOTE — PATIENT INSTRUCTIONS
find ways to manage stress. If you're feeling depressed or hopeless, talk to someone. A counselor can help. If you don't have a counselor, talk to your doctor. Talk to your doctor if you think you may have a problem with alcohol or drug use. This includes prescription medicines and illegal drugs. Avoid tobacco and nicotine: Don't smoke, vape, or chew. If you need help quitting, talk to your doctor. Practice safer sex. Getting tested, using condoms or dental dams, and limiting sex partners can help prevent STIs. Use birth control if it's important to you to prevent pregnancy. Talk with your doctor about your choices and what might be best for you. Prevent problems where you can. Protect your skin from too much sun, wash your hands, brush your teeth twice a day, and wear a seat belt in the car. Where can you learn more? Go to http://www.mohan.com/ and enter P072 to learn more about \"Well Visit, Ages 25 to 72: Care Instructions. \"  Current as of: November 14, 2022               Content Version: 13.7  © 2006-2023 [employer]. Care instructions adapted under license by [facility] ([facility]). If you have questions about a medical condition or this instruction, always ask your healthcare professional. [address] any warranty or liability for your use of this information.

## 2023-07-12 ENCOUNTER — PROCEDURE VISIT (OUTPATIENT)
Dept: FAMILY MEDICINE CLINIC | Age: 33
End: 2023-07-12
Payer: MEDICAID

## 2023-07-12 VITALS
BODY MASS INDEX: 30.99 KG/M2 | RESPIRATION RATE: 16 BRPM | DIASTOLIC BLOOD PRESSURE: 82 MMHG | HEART RATE: 100 BPM | WEIGHT: 192 LBS | SYSTOLIC BLOOD PRESSURE: 136 MMHG | OXYGEN SATURATION: 98 %

## 2023-07-12 DIAGNOSIS — J45.21 MILD INTERMITTENT ASTHMA WITH ACUTE EXACERBATION: ICD-10-CM

## 2023-07-12 DIAGNOSIS — L72.0 EPIDERMAL CYST: Primary | ICD-10-CM

## 2023-07-12 DIAGNOSIS — Z76.89 ENCOUNTER FOR INCISION AND DRAINAGE PROCEDURE: ICD-10-CM

## 2023-07-12 PROCEDURE — G8417 CALC BMI ABV UP PARAM F/U: HCPCS | Performed by: NURSE PRACTITIONER

## 2023-07-12 PROCEDURE — 1036F TOBACCO NON-USER: CPT | Performed by: NURSE PRACTITIONER

## 2023-07-12 PROCEDURE — G8427 DOCREV CUR MEDS BY ELIG CLIN: HCPCS | Performed by: NURSE PRACTITIONER

## 2023-07-12 PROCEDURE — 10060 I&D ABSCESS SIMPLE/SINGLE: CPT | Performed by: NURSE PRACTITIONER

## 2023-07-12 PROCEDURE — 99214 OFFICE O/P EST MOD 30 MIN: CPT | Performed by: NURSE PRACTITIONER

## 2023-07-12 RX ORDER — FLUTICASONE PROPIONATE AND SALMETEROL XINAFOATE 115; 21 UG/1; UG/1
2 AEROSOL, METERED RESPIRATORY (INHALATION) 2 TIMES DAILY
Qty: 1 EACH | Refills: 11 | Status: SHIPPED | OUTPATIENT
Start: 2023-07-12

## 2023-07-12 ASSESSMENT — ENCOUNTER SYMPTOMS
EYE PAIN: 0
EYE DISCHARGE: 0
BACK PAIN: 0
CHEST TIGHTNESS: 0
SINUS PRESSURE: 0
BLOOD IN STOOL: 0
COUGH: 0
WHEEZING: 0
VOMITING: 0
DIARRHEA: 0
FACIAL SWELLING: 0
CONSTIPATION: 0
COLOR CHANGE: 0
RECTAL PAIN: 0
EYE REDNESS: 0
NAUSEA: 0
ABDOMINAL DISTENTION: 0
SINUS PAIN: 0
TROUBLE SWALLOWING: 0
SHORTNESS OF BREATH: 0
EYE ITCHING: 0
ABDOMINAL PAIN: 0
SORE THROAT: 0

## 2023-07-12 NOTE — PATIENT INSTRUCTIONS
Keep area dry and covered for 24 hours.    After 24 hours may shower as normal.   F/u in 12 days for suture removal

## 2023-07-12 NOTE — PROGRESS NOTES
[address] MEDICINE   [facility] [address]  Dept: [phone]  Loc: [address] (:  1990) is a 28 y.o. male,Established patient, here for evaluation of the following chief complaint(s):  Procedure (Lesion on back of neck )      ASSESSMENT/PLAN:  1. Epidermal cyst  See procedure note below. Patient to follow up in 12 days for suture removal.   -     [address] - MT DRAIN SKIN ABSCESS SIMPLE  2. Mild intermittent asthma with acute exacerbation  -     fluticasone-salmeterol (ADVAIR HFA) 115-21 MCG/ACT inhaler; Inhale 2 puffs into the lungs 2 times daily, Disp-1 each, R-11Normal  3. Encounter for incision and drainage procedure      Return in about 12 days (around 2023) for Suture Removal.    SUBJECTIVE/OBJECTIVE:  Patient presents today for removal of a cyst to the side of his neck, he has had this for over 2 years             has a past medical history of Asthma and History of chickenpox. Review of Systems   Constitutional:  Negative for activity change, appetite change, fatigue, fever and unexpected weight change. HENT:  Negative for congestion, dental problem, ear pain, facial swelling, mouth sores, postnasal drip, sinus pressure, sinus pain, sore throat and trouble swallowing. Eyes:  Negative for pain, discharge, redness, itching and visual disturbance. Respiratory:  Negative for cough, chest tightness, shortness of breath and wheezing. Cardiovascular:  Negative for chest pain, palpitations and leg swelling. Gastrointestinal:  Negative for abdominal distention, abdominal pain, blood in stool, constipation, diarrhea, nausea, rectal pain and vomiting. Endocrine: Negative for cold intolerance and heat intolerance. Genitourinary:  Negative for difficulty urinating, dysuria, frequency, hematuria, penile pain, scrotal swelling, testicular pain and urgency.    Musculoskeletal:  Negative for arthralgias,

## 2023-07-23 NOTE — PROGRESS NOTES
[address] [address]  Dept: [phone]  Dept Fax: [phone]  Loc: [phone]    Med Tapia is a 28 y.o. male who presents today for his medical conditions/complaints as noted below. Chief Complaint   Patient presents with    Suture / Staple Removal     Cyst removal 7/12/23       HPI:     Patient presents to the office today for a suture removal.    Patient had I&D/removal of epidermal cyst on left side of neck 12 days ago by PCP. 2 sutures placed. He states the area appears to be healing well. Keeping covered with bandage. No drainage or bleeding.       Past Medical History:   Diagnosis Date    Asthma     History of chickenpox       Past Surgical History:   Procedure Laterality Date    DIAPHRAGMATIC HERNIA REPAIR      WRIST GANGLION EXCISION Right        Family History   Problem Relation Age of Onset    Asthma Mother     Mult Sclerosis Father     Heart Disease Father     High Blood Pressure Father     Asthma Brother     Alzheimer's Disease Maternal Grandmother     Liver Disease Maternal Grandfather     Heart Disease Paternal Grandmother     High Blood Pressure Paternal Grandmother     Mult Sclerosis Paternal Grandmother     Cancer Paternal Grandfather         Pancreatic        Social History     Tobacco Use    Smoking status: Never    Smokeless tobacco: Never   Substance Use Topics    Alcohol use: Never      Current Outpatient Medications   Medication Sig Dispense Refill    fluticasone-salmeterol (ADVAIR HFA) 115-21 MCG/ACT inhaler Inhale 2 puffs into the lungs 2 times daily 1 each 11    omeprazole (PRILOSEC) 20 MG delayed release capsule Take 1 capsule by mouth every morning (before breakfast) 90 capsule 3    albuterol sulfate HFA (VENTOLIN HFA) 108 (90 Base) MCG/ACT inhaler Inhale 2 puffs into the lungs every 6 hours as needed for Wheezing 18 g 3    albuterol (ACCUNEB) 0.63 MG/3ML nebulizer solution Take 3

## 2023-07-24 ENCOUNTER — OFFICE VISIT (OUTPATIENT)
Dept: FAMILY MEDICINE CLINIC | Age: 33
End: 2023-07-24

## 2023-07-24 VITALS
RESPIRATION RATE: 16 BRPM | WEIGHT: 194 LBS | DIASTOLIC BLOOD PRESSURE: 82 MMHG | HEIGHT: 66 IN | BODY MASS INDEX: 31.18 KG/M2 | SYSTOLIC BLOOD PRESSURE: 110 MMHG | OXYGEN SATURATION: 98 % | HEART RATE: 82 BPM

## 2023-07-24 DIAGNOSIS — Z48.02 VISIT FOR SUTURE REMOVAL: Primary | ICD-10-CM

## 2023-07-24 PROCEDURE — 99024 POSTOP FOLLOW-UP VISIT: CPT | Performed by: STUDENT IN AN ORGANIZED HEALTH CARE EDUCATION/TRAINING PROGRAM

## 2023-12-08 DIAGNOSIS — J45.909 MILD ASTHMA WITHOUT COMPLICATION, UNSPECIFIED WHETHER PERSISTENT: ICD-10-CM

## 2023-12-08 RX ORDER — ALBUTEROL SULFATE 0.63 MG/3ML
SOLUTION RESPIRATORY (INHALATION)
Qty: 90 ML | Refills: 0 | OUTPATIENT
Start: 2023-12-08

## 2023-12-08 RX ORDER — ALBUTEROL SULFATE 2.5 MG/3ML
2.5 SOLUTION RESPIRATORY (INHALATION) 4 TIMES DAILY PRN
Qty: 120 EACH | Refills: 1 | Status: SHIPPED | OUTPATIENT
Start: 2023-12-08

## 2023-12-08 NOTE — TELEPHONE ENCOUNTER
Patient's last appointment was : 7/24/2023  Patient's next appointment is : Visit date not found    Return in about 1 year (around 6/20/2024) recommended.   Last refilled:10/20/22. 90mL, 0 refill

## 2023-12-10 DIAGNOSIS — J45.909 MILD ASTHMA WITHOUT COMPLICATION, UNSPECIFIED WHETHER PERSISTENT: ICD-10-CM

## 2023-12-11 NOTE — TELEPHONE ENCOUNTER
Patient's last appointment was : 7/24/2023  Patient's next appointment is : Visit date not found  Last refilled:6/20/22, 18g, 3 refills

## 2023-12-12 RX ORDER — ALBUTEROL SULFATE 90 UG/1
2 AEROSOL, METERED RESPIRATORY (INHALATION) EVERY 6 HOURS PRN
Qty: 18 G | Refills: 1 | Status: SHIPPED | OUTPATIENT
Start: 2023-12-12

## 2023-12-12 NOTE — TELEPHONE ENCOUNTER
Patient calling to check on refill status- did advise patient that provider was out of office on Monday and will address today/ tomorrow

## 2024-10-08 DIAGNOSIS — J45.21 MILD INTERMITTENT ASTHMA WITH ACUTE EXACERBATION: ICD-10-CM

## 2024-10-08 RX ORDER — FLUTICASONE PROPIONATE AND SALMETEROL XINAFOATE 115; 21 UG/1; UG/1
AEROSOL, METERED RESPIRATORY (INHALATION)
Qty: 12 G | Refills: 0 | Status: CANCELLED | OUTPATIENT
Start: 2024-10-08

## 2024-10-08 NOTE — TELEPHONE ENCOUNTER
Patient's last appointment was : 7/24/2023  Patient's next appointment is : 10/18/2024  Last refilled:      Fluticasone-salmeterol (advair HFA) 115-21 mcg, 7/12/23

## 2024-10-09 RX ORDER — FLUTICASONE PROPIONATE AND SALMETEROL XINAFOATE 230; 21 UG/1; UG/1
2 AEROSOL, METERED RESPIRATORY (INHALATION) 2 TIMES DAILY
Qty: 8 G | Refills: 3 | Status: SHIPPED | OUTPATIENT
Start: 2024-10-09

## 2024-10-16 SDOH — ECONOMIC STABILITY: FOOD INSECURITY: WITHIN THE PAST 12 MONTHS, THE FOOD YOU BOUGHT JUST DIDN'T LAST AND YOU DIDN'T HAVE MONEY TO GET MORE.: NEVER TRUE

## 2024-10-16 SDOH — ECONOMIC STABILITY: FOOD INSECURITY: WITHIN THE PAST 12 MONTHS, YOU WORRIED THAT YOUR FOOD WOULD RUN OUT BEFORE YOU GOT MONEY TO BUY MORE.: NEVER TRUE

## 2024-10-16 SDOH — ECONOMIC STABILITY: TRANSPORTATION INSECURITY
IN THE PAST 12 MONTHS, HAS LACK OF TRANSPORTATION KEPT YOU FROM MEETINGS, WORK, OR FROM GETTING THINGS NEEDED FOR DAILY LIVING?: NO

## 2024-10-16 SDOH — ECONOMIC STABILITY: INCOME INSECURITY: HOW HARD IS IT FOR YOU TO PAY FOR THE VERY BASICS LIKE FOOD, HOUSING, MEDICAL CARE, AND HEATING?: NOT HARD AT ALL

## 2024-10-16 ASSESSMENT — PATIENT HEALTH QUESTIONNAIRE - PHQ9
SUM OF ALL RESPONSES TO PHQ9 QUESTIONS 1 & 2: 0
1. LITTLE INTEREST OR PLEASURE IN DOING THINGS: NOT AT ALL
2. FEELING DOWN, DEPRESSED OR HOPELESS: NOT AT ALL
1. LITTLE INTEREST OR PLEASURE IN DOING THINGS: NOT AT ALL
SUM OF ALL RESPONSES TO PHQ9 QUESTIONS 1 & 2: 0
2. FEELING DOWN, DEPRESSED OR HOPELESS: NOT AT ALL
SUM OF ALL RESPONSES TO PHQ QUESTIONS 1-9: 0

## 2024-10-18 ENCOUNTER — OFFICE VISIT (OUTPATIENT)
Dept: FAMILY MEDICINE CLINIC | Age: 34
End: 2024-10-18

## 2024-10-18 VITALS
HEIGHT: 66 IN | OXYGEN SATURATION: 97 % | DIASTOLIC BLOOD PRESSURE: 82 MMHG | SYSTOLIC BLOOD PRESSURE: 138 MMHG | BODY MASS INDEX: 29.7 KG/M2 | WEIGHT: 184.8 LBS | HEART RATE: 68 BPM | RESPIRATION RATE: 16 BRPM

## 2024-10-18 DIAGNOSIS — J45.909 MILD ASTHMA WITHOUT COMPLICATION, UNSPECIFIED WHETHER PERSISTENT: ICD-10-CM

## 2024-10-18 DIAGNOSIS — Z23 NEED FOR INFLUENZA VACCINATION: ICD-10-CM

## 2024-10-18 DIAGNOSIS — R20.0 BILATERAL HAND NUMBNESS: ICD-10-CM

## 2024-10-18 DIAGNOSIS — Z13.220 LIPID SCREENING: ICD-10-CM

## 2024-10-18 DIAGNOSIS — Z00.00 WELLNESS EXAMINATION: Primary | ICD-10-CM

## 2024-10-18 DIAGNOSIS — Z13.1 DIABETES MELLITUS SCREENING: ICD-10-CM

## 2024-10-18 LAB
CHOLESTEROL, FASTING: 165 MG/DL (ref 100–199)
GLUCOSE FASTING: 52 MG/DL (ref 70–108)
HDLC SERPL-MCNC: 38 MG/DL
LDLC SERPL CALC-MCNC: 117 MG/DL
TRIGLYCERIDE, FASTING: 48 MG/DL (ref 0–199)

## 2024-10-18 RX ORDER — ALBUTEROL SULFATE 90 UG/1
2 AEROSOL, METERED RESPIRATORY (INHALATION) EVERY 6 HOURS PRN
Qty: 18 G | Refills: 1 | Status: SHIPPED | OUTPATIENT
Start: 2024-10-18

## 2024-10-18 NOTE — PROGRESS NOTES
After obtaining consent, and per orders of RENNY Sutherland , injection of Flucelvax  given in Left deltoid by Nyla Phillips LPN. Patient instructed to remain in clinic for 20 minutes afterwards, and to report any adverse reaction to me immediately.    Immunizations Administered       Name Date Dose Route    Influenza, FLUCELVAX, (age 6 mo+) IM, Trivalent PF, 0.5mL 10/18/2024 0.5 mL Intramuscular    Site: Deltoid- Left    Lot: [id number]    NDC: 81043-176-35        Vaccination checklist completed and VIS given.  Patient tolerated well.   
Health Maintenance Due   Topic Date Due    Pneumococcal 0-64 years Vaccine (1 of 2 - PCV) Never done    Hepatitis B vaccine (1 of 3 - 19+ 3-dose series) Never done    DTaP/Tdap/Td vaccine (1 - Tdap) Never done    Flu vaccine (1) 08/01/2024    COVID-19 Vaccine (5 - 2023-24 season) 09/01/2024        Patient declines flu vaccine.   
Venipuncture obtained from  right arm. Patient tolerated the procedure without complications or complaints.    
 Negative for congestion, dental problem, ear pain, facial swelling, mouth sores, postnasal drip, sinus pressure, sinus pain, sore throat and trouble swallowing.    Eyes:  Negative for pain, discharge, redness, itching and visual disturbance.   Respiratory:  Negative for cough, chest tightness, shortness of breath and wheezing.    Cardiovascular:  Negative for chest pain, palpitations and leg swelling.   Gastrointestinal:  Negative for abdominal distention, abdominal pain, blood in stool, constipation, diarrhea, nausea, rectal pain and vomiting.   Endocrine: Negative for cold intolerance and heat intolerance.   Genitourinary:  Negative for difficulty urinating, dysuria, frequency, hematuria, penile pain, scrotal swelling, testicular pain and urgency.   Musculoskeletal:  Negative for arthralgias, back pain, gait problem and neck pain.   Skin:  Negative for color change, rash and wound.   Neurological:  Positive for numbness. Negative for dizziness, seizures, weakness, light-headedness and headaches.   Psychiatric/Behavioral:  Negative for agitation and sleep disturbance.        Physical Exam  Vitals and nursing note reviewed.   Constitutional:       General: He is not in acute distress.     Appearance: Normal appearance. He is well-developed. He is not diaphoretic.   HENT:      Head: Normocephalic and atraumatic.      Right Ear: Tympanic membrane and external ear normal. Tympanic membrane is not injected or erythematous.      Left Ear: Tympanic membrane and external ear normal. Tympanic membrane is not injected or erythematous.      Nose: Nose normal.      Mouth/Throat:      Mouth: Mucous membranes are moist.      Pharynx: Oropharynx is clear. Uvula midline.   Eyes:      General:         Right eye: No discharge.         Left eye: No discharge.      Conjunctiva/sclera: Conjunctivae normal.      Pupils: Pupils are equal, round, and reactive to light.   Neck:      Thyroid: No thyromegaly.      Trachea: Trachea normal.

## 2024-10-28 ENCOUNTER — TELEPHONE (OUTPATIENT)
Dept: FAMILY MEDICINE CLINIC | Age: 34
End: 2024-10-28

## 2024-10-28 NOTE — TELEPHONE ENCOUNTER
----- Message from ZANDRA Sutherland CNP sent at 10/28/2024  4:56 PM EDT -----  Lipid panel looks good. No concerns.

## 2024-10-29 ENCOUNTER — TELEPHONE (OUTPATIENT)
Dept: FAMILY MEDICINE CLINIC | Age: 34
End: 2024-10-29

## 2024-10-31 NOTE — TELEPHONE ENCOUNTER
Spoke with [facility] and spoke to Dara. Patient picked up medication, no further action needed.